# Patient Record
Sex: MALE | Race: WHITE
[De-identification: names, ages, dates, MRNs, and addresses within clinical notes are randomized per-mention and may not be internally consistent; named-entity substitution may affect disease eponyms.]

---

## 2020-08-05 ENCOUNTER — HOSPITAL ENCOUNTER (OUTPATIENT)
Dept: HOSPITAL 53 - M SDC | Age: 70
Discharge: HOME | End: 2020-08-05
Attending: INTERNAL MEDICINE
Payer: MEDICARE

## 2020-08-05 DIAGNOSIS — Z79.82: ICD-10-CM

## 2020-08-05 DIAGNOSIS — K21.9: ICD-10-CM

## 2020-08-05 DIAGNOSIS — E11.9: ICD-10-CM

## 2020-08-05 DIAGNOSIS — I10: ICD-10-CM

## 2020-08-05 DIAGNOSIS — K31.89: ICD-10-CM

## 2020-08-05 DIAGNOSIS — Z80.0: ICD-10-CM

## 2020-08-05 DIAGNOSIS — K44.9: ICD-10-CM

## 2020-08-05 DIAGNOSIS — Z87.891: ICD-10-CM

## 2020-08-05 DIAGNOSIS — Z12.11: Primary | ICD-10-CM

## 2020-08-05 DIAGNOSIS — Z79.899: ICD-10-CM

## 2020-08-05 DIAGNOSIS — K30: ICD-10-CM

## 2020-08-05 DIAGNOSIS — K64.0: ICD-10-CM

## 2020-08-05 PROCEDURE — 43239 EGD BIOPSY SINGLE/MULTIPLE: CPT

## 2020-08-05 PROCEDURE — 88305 TISSUE EXAM BY PATHOLOGIST: CPT

## 2020-09-09 NOTE — ROOR
________________________________________________________________________________

Patient Name: Kolton Corea       Procedure Date: 8/5/2020 7:33 AM

MRN: [id number]                          Account Number: [id number]

YOB: 1950                Age: 70

Room: [facility]                            Gender: Male

Note Status: Finalized                 

________________________________________________________________________________

 

Procedure:           Total Colonoscopy to Cecum

Indications:         Screening patient at increased risk: Family history of 

                     colorectal cancer in multiple 1st-degree relatives, Last 

                     colonoscopy: 2012

Providers:           Pola Bermudez MD

Referring MD:        ARISTIDES DA SILVA MD

Requesting Provider: 

Medicines:           Monitored Anesthesia Care

Complications:       No immediate complications.

________________________________________________________________________________

Procedure:           Pre-Anesthesia Assessment:

                     - The heart rate, respiratory rate, oxygen saturations, 

                     blood pressure, adequacy of pulmonary ventilation, and 

                     response to care were monitored throughout the procedure.

                     The Colonoscope was introduced through the anus and 

                     advanced to the cecum, identified by appendiceal orifice 

                     and ileocecal valve. The colonoscopy was performed 

                     without difficulty. The patient tolerated the procedure 

                     well. The quality of the bowel preparation was excellent.

                                                                                

Findings:

     The perianal and digital rectal examinations were normal.

     Non-bleeding internal hemorrhoids were found during retroflexion. The 

     hemorrhoids were small and Grade I (internal hemorrhoids that do not 

     prolapse).

     The exam was otherwise without abnormality on direct and retroflexion 

     views.

     The exam was otherwise without abnormality on direct and retroflexion 

     views.

                                                                                

Impression:          - Non-bleeding internal hemorrhoids.

                     - The examination was otherwise normal on direct and 

                     retroflexion views.

                     - The examination was otherwise normal on direct and 

                     retroflexion views.

                     - No specimens collected.

                     - The exam was otherwise normal to the cecum.

Recommendation:      - Patient has a contact number available for emergencies. 

                     The signs and symptoms of potential delayed complications 

                     were discussed with the patient. Return to normal 

                     activities tomorrow. Written discharge instructions were 

                     provided to the patient.

                     - Discharge patient to home.

                     - Continue present medications.

                     - Repeat colonoscopy in 5 years for screening purposes.

                     - Return to referring physician.

                     - The findings and recommendations were discussed with 

                     the patient.

                                                                                

 

Pola Bermudez MD

____________________

Pola Bermudez MD

8/5/2020 8:14:52 AM

Number of Addenda: 0

 

Note Initiated On: 8/5/2020 7:33 AM

Estimated Blood Loss:

     Estimated blood loss: none.

## 2020-09-09 NOTE — ROOR
________________________________________________________________________________

Patient Name: Kolton Corea       Procedure Date: 8/5/2020 7:32 AM

MRN: [id number]                          Account Number: [id number]

YOB: 1950                Age: 70

Room: [facility]                            Gender: Male

Note Status: Finalized                 

________________________________________________________________________________

 

Procedure:           Upper Endoscopy + Biopsies

Indications:         Epigastric abdominal pain, Functional Dyspepsia

Providers:           Pola Bermudez MD

Referring MD:        ARISTIDES DA SILVA MD

Requesting Provider: 

Medicines:           Monitored Anesthesia Care

Complications:       No immediate complications.

________________________________________________________________________________

Procedure:           Pre-Anesthesia Assessment:

                     - The heart rate, respiratory rate, oxygen saturations, 

                     blood pressure, adequacy of pulmonary ventilation, and 

                     response to care were monitored throughout the procedure.

                     The Endoscope was introduced through the mouth, and 

                     advanced to the second part of duodenum. The upper GI 

                     endoscopy was accomplished without difficulty. The 

                     patient tolerated the procedure well.

                                                                                

Findings:

     The Z-line was regular and was found 40 cm from the incisors. Multiple 

     biopsies were obtained with cold forceps for evaluation to rule out 

     Manriquez's Esophagus randomly at the gastroesophageal junction.

     A small hiatal hernia was present.

     Multiple localized, diminutive non-bleeding erosions were found in the 

     gastric antrum. There were no stigmata of recent bleeding. Biopsies were 

     taken with a cold forceps for Helicobacter pylori testing.

     The exam of the duodenum was otherwise normal.

                                                                                

Impression:          - Z-line regular, 40 cm from the incisors.

                     - Small hiatal hernia.

                     - Non-bleeding erosive gastropathy. Biopsied.

                     - Multiple biopsies were obtained at the gastroesophageal 

                     junction.

                     - The examination was otherwise normal.

Recommendation:      - Patient has a contact number available for emergencies. 

                     The signs and symptoms of potential delayed complications 

                     were discussed with the patient. Return to normal 

                     activities tomorrow. Written discharge instructions were 

                     provided to the patient.

                     - High fiber diet.

                     - Discharge patient to home.

                     - Follow an antireflux regimen.

                     - Continue present medications.

                     - Await pathology results.

                     - Telephone GI clinic for pathology results in 1 week.

                     - Repeat upper endoscopy for surveillance based on 

                     pathology results.

                     - Return to referring physician.

                     - The findings and recommendations were discussed with 

                     the patient.

                                                                                

 

Pola Bermudez MD

____________________

Pola Bermudez MD

8/5/2020 7:54:51 AM

Electronically signed by Pola Bermudez MD

Number of Addenda: 0

 

Note Initiated On: 8/5/2020 7:32 AM

Estimated Blood Loss:

     Estimated blood loss: none.

## 2020-11-16 ENCOUNTER — HOSPITAL ENCOUNTER (OUTPATIENT)
Dept: HOSPITAL 53 - M PAIN | Age: 70
End: 2020-11-16
Attending: NURSE PRACTITIONER
Payer: MEDICARE

## 2020-11-16 DIAGNOSIS — E11.9: ICD-10-CM

## 2020-11-16 DIAGNOSIS — Z79.899: ICD-10-CM

## 2020-11-16 DIAGNOSIS — Z79.82: ICD-10-CM

## 2020-11-16 DIAGNOSIS — I10: ICD-10-CM

## 2020-11-16 DIAGNOSIS — Z87.891: ICD-10-CM

## 2020-11-16 DIAGNOSIS — M54.2: Primary | ICD-10-CM

## 2020-11-18 NOTE — ECWPNPC
PATIENT NAME: QUE PERES

: 1950

GENDER: MALE

MRN: [id number]

VISIT DATE: 2020

DISCHARGE DATE: 20 1351

VISIT LOCKED DATE TIME: 

PHYSICIAN: VERNA CISSE 

RESOURCE: VERNA CISSE 

 

           

           

REASON FOR APPOINTMENT

           

          1. CERVICAL PAIN

           

HISTORY OF PRESENT ILLNESS

           

      DEPRESSION SCREENING:

      PHQ-9

           

           

          LITTLE INTEREST OR PLEASURE IN DOING THINGSNOT AT ALL

          FEELING DOWN, DEPRESSED, OR HOPELESSSEVERAL DAYS

          TROUBLE FALLING OR STAYING ASLEEP, OR SLEEPING TOO MUCHNOT AT ALL

          FEELING TIRED OR HAVING LITTLE ENERGYNOT AT ALL

          POOR APPETITE OR OVEREATING MORE THAN HALF THE DAYS

          FEELING BAD ABOUT YOURSELF-OR THAT YOU ARE A FAILURE OR HAVE LET

          YOURSELF OR YOUR FAMILY DOWN NOT AT ALL

          TROUBLE CONCENTRATING ON THINGS, SUCH AS READING THE NEWSPAPER OR

          WATCHING TELEVISION NOT AT ALL

          MOVING OR SPEAKING SO SLOWLY THAT OTHER PEOPLE COULD HAVE

          NOTICED. OR THE OPPOSITE- BEING SO FIDGETY OR RESTLESS THAT YOU

          HAVE BEEN MOVING AROUND A LOT MORE THAN USUALNOT AT ALL

          THOUGHTS THAT YOU WOULD BE BETTER OFF DEAD, OR OF HURTING

          YOURSELF IN SOME WAY?NOT AT ALL

          TOTAL SCORE:3

          INTERPRETATIONMINIMAL DEPRESSION

           

      PHQ-2 (2015 EDITION)

           

           

          LITTLE INTEREST OR PLEASURE IN DOING THINGS?SEVERAL DAYS

          FEELING DOWN, DEPRESSED, OR HOPELESS?SEVERAL DAYS

          TOTAL SCORE2

           

           70-YEAR-OLD MALE IN FOR INITIAL PAIN CONSULT. PATIENT HAS

          COMPLAINTS OF NECK PAIN THAT HAS BEEN PRESENT FOR THE PAST

          SEVERAL YEARS. HE DENIES HISTORY OF INJECTIONS TO HELP WITH HIS

          SYMPTOMS. PATIENT DOES ADMIT TO TAKING EXCEDRIN MIGRAINE WHICH

          DOES ALLEVIATE HIS SYMPTOMS CURRENTLY. HE RATES HIS PAIN

          CURRENTLY AT A 0 OUT OF 10 BUT DOES STATE WHEN HE HAS

          EXACERBATIONS OF HIS PAIN THAT RADIATES DOWN THE LEFT SIDE OF HIS

          NECK.

           

      GENERAL:

           - - -.

           

      FALL RISK SCREENING:

      SCREENING

           

           

          :TWO OR MORE FALLS WITHOUT INJURY IN THE PAST YEAR

           

      PAIN SCREENING:

      PATIENT HAS A COMPLAINT OF ACUTE OR CHRONIC PAIN

           

           

          :YES

          INTENSITY OF PAIN (SCALE OF 1 TO 10):0

           

      NURSING NOTE:

           - - -.

           

      PAIN CENTER INTAKE QUESTIONS:

      DO YOU HAVE A HISTORY OF MRSA?

           

           

          :NO

           

      DO YOU TAKE A BLOOD THINNERS?

           

           

          :NO

           

      DO YOU HAVE ANY BLEEDING DISORDERS?

           

           

          :NO

           

      ANY NEW NUMBNESS OR WEAKNESS IN YOUR LEGS OR ARMS?

           

           

          :NO

           

      ANY PACEMAKER,DEFIBRILLATOR, OR DORSAL COLUMN

          STIMULATOR?

           

           

          :NO

           

      DO YOU HAVE ANY RASHES OR OPEN SORES?

           

           

          :NO

           

      ARE YOU ALLERGIC TO IV DYE?

           

           

          :NO

           

      ARE YOU DIABETIC?

           

           

          :YES

           

      ANY NEW PROBLEMS WITH YOUR MEDICATIONS?

           

           

          :NO

           

      HAVE YOU RECEIVED A VACCINE IN THE PAST 30 DAYS?

           

           

          :YES

          IF SO WHAT VACCINE AND WHEN? FLU VACCINE 10/15/20

           

      DO YOU PLAN TO RECEIVE A VACCINE IN THE NEXT 21

          DAYS?

           

           

          :NO

           

      DO YOU NEED ANY PRESCRIPTION?

           

           

          :NO

           

      DO YOU TAKE ANY IMMUNOSUPPRESSIVE MEDICATIONS?

           

           

          :NO

           

CURRENT MEDICATIONS

           

          TAKING SIMVASTATIN 10 MG TABLET 1 TABLET IN THE EVENING ORALLY

          ONCE A DAY

          TAKING VITAMIN D 80279 UNIT CAPSULE 1 CAPSULE ORALLY EVERY OTHER

          WEEK

          TAKING CARVEDILOL 3.125 MG TABLET 1 TABLET WITH FOOD ORALLY TWICE

          A DAY

          TAKING FINASTERIDE 5 MG TABLET 1 TABLET ORALLY ONCE A DAY

          TAKING SILDENAFIL CITRATE 100 MG TABLET 1 TABLET AS NEEDED ORALLY

          ONCE A DAY

          TAKING OMEPRAZOLE 40 MG CAPSULE DELAYED RELEASE 1 CAPSULE 30

          MINUTES BEFORE MORNING MEAL ORALLY EVERY OTHER DAY

          TAKING DULOXETINE HCL 30 MG CAPSULE DELAYED RELEASE PARTICLES 1

          CAPSULE ORALLY ONCE A DAY

          TAKING ASPIRIN 81 81 MG TABLET DELAYED RELEASE 1 TABLET ORALLY

          ONCE A DAY

          TAKING MULTIVITAL - TABLET AS DIRECTED ORALLY

          TAKING AREDS OTC

          TAKING CALCIUM + D 600-200 MG-UNIT TABLET 1 TABLET ORALLY TWICE A

          DAY

          TAKING ASPIRIN-ACETAMINOPHEN-CAFFEINE 240-125-32 MG TABLET AS

          DIRECTED ORALLY

          MEDICATION LIST REVIEWED AND RECONCILED WITH THE PATIENT

           

PAST MEDICAL HISTORY

           

          DM

          HIGH CHOLESTEROL

          HTN

          CHRONIC NECK PAIN

           

ALLERGIES

           

          N.K.D.A.

           

SURGICAL HISTORY

           

          HEART CATHETERIZATION 

          CHOLECYSTECTOMY 

          DEVIATED SEPTUM REPAIR 

          PARATHYROID REMOVED 

           

FAMILY HISTORY

           

          FATHER: 

          MOTHER: 

          4 BROTHER(S) , 2 SISTER(S) . 3DAUGHTER(S) .

           

SOCIAL HISTORY

           

          GENERAL:

           

          TOBACCO USE

          ARE YOU A:FORMER SMOKER

          HOW LONG HAS IT BEEN SINCE YOU LAST SMOKED?> 10 YEARS

           

           

          LATEX QUESTIONNAIRE

          LATEX ALLERGY : HAVE YOU EVER DEVELOPED ANY TYPE OF REACTION

          AFTER HANDLING LATEX PRODUCTS SUCH AS RUBBER GLOVES, CONDOMS,

          DIAPHRAGMS, BALLOONS, SOCKS, OR UNDERWEAR?NO

          LATEX ALLERGY : HAVE YOU EVER DEVELOPED ANY TYPE OF REACTION

          DURING OR AFTER DENTAL APPOINTMENT, VAGINAL/RECTAL EXAMINATION,

          SURGICAL PROCEDURE, OR ANY OTHER EXPOSURE?NO

          LATEX RISK : HAVE YOU EVER HAD ANY DIFFICULTY BREATHING OR HIVES

          AFTER EATING OR HANDLING ANY FRUITS, OR VEGETABLES; SUCH AS KIWI,

          BANANAS, STONE FRUITS, OR CHESTNUTSNO

          LATEX RISK : DO YOU HAVE A PREVIOUS PERSONAL HISTORY OF MORE THAN

          NINE SURGERIES, SPINA BIFIDA, OR REPEATED CATHERIZATIONS? NO

          LATEX RISK : ARE YOU FREQUENTLY EXPOSED TO LATEX PRODUCTS IN YOUR

          OCCUPATION?NO

          DATE ASKED : 2020

           

           

          ALCOHOL SCREENING

          DID YOU HAVE A DRINK CONTAINING ALCOHOL IN THE PAST YEAR?NO

          POINTS0

          INTERPRETATIONNEGATIVE

           

           

          RECREATIONAL DRUG USE

          DRUG USE?NO

           

           

          LANGUAGE

          LANGUAGES SPOKEN:ENGLISH

           

           

          DOMESTIC VIOLENCE

          DO YOU FEEL SAFE IN YOUR ENVIRONMENT?YES

           

           

          PAIN CLINIC PFS, CLERGY, PUBLIC HEALTH REFERRALS

          HAS THE PATIENT BEEN EDUCATED REGARDING HIS/HER PLAN OF CARE?YES

          HAS THE PATIENT BEEN EDUCATED REGARDING PAIN, THE RISK FOR PAIN,

          THE IMPORTANCE OF EFFECTIVE PAIN MANAGEMENT, AND THE PAIN

          ASSESSMENT PROCESS?YES

           

           

          ADVANCE DIRECTIVE

          ADVANCE DIRECTIVE DISCUSSED WITH PATIENT:YES BELIA (WIFE)

           

HOSPITALIZATION/MAJOR DIAGNOSTIC PROCEDURE

           

          SURGERY

          SLEEP STUDY FOR SLEEP APNEA

           

REVIEW OF SYSTEMS

           

      CONSTITUTIONAL:

           

          ANY RECENT FEVER    NO . CHILLS    NO . WEIGHT CHANGE OF UNKNOWN

          REASONS    NO .

           

      MUSCULOSKELETAL:

           

          ANY UNUSUAL JOINT PAIN OR SWELLING NOT MENTIONED    NO . SYSTEMIC

          LUPUS    NO . ANY NEUROMUSCULAR DISORDER NOT MENTIONED    NO .

          LYME DISEASE    NO .

           

      GASTROENTEROLOGY:

           

          ANY NEW CHANGE IN BOWEL CONTROL?    NO . HISTORY OF LIVER

          DISORDER NOT MENTIONED    NO . HISTORY OF UNUSUAL ABDOMINAL PAIN

          OR CRAMPING NOT MENTIONED    NO . NO CONSTIPATION.

           

      GENITOURINARY:

           

          ANY NEW CHANGE IN BLADDER CONTROL?    NO . ANY RENAL/KIDNEY

          CONDITON NOT MENTIONED    NO .

           

      NEUROLOGY:

           

          HISTORY OF TBI NOT MENTIONED    NO . OTHER NEW NUMBNESS OR PAIN

          PATTERNS NOT MENTIONED    NO . NEW ONSET DIZZINESS OR

          NEUROLOGICAL CHANGES NOT MENTIONED    NO . HISTORY OF SEVERE

          HEADACHES NOT MENTIONED    NO . HISTORY OF STROKE OR NEUROLOGICAL

          DISORDER NOT MENTIONED    NO .

           

      CARDIOLOGY:

           

          HEART SURGERY    NO . CONGESTIVE HEART FAILURE/FLUID OVERLOAD NOT

          MENTIONED    NO . HISTORY OF CHEST PAIN,IRREGULAR HEART BEAT NOT

          MENTIONED    NO .

           

      RESPIRATORY:

           

          SHORTNESS OF BREATH ON EXERTION, WHEEZES, UNUSUAL COUGH NOT

          MENTIONED    NO .

           

      ENDOCRINOLOGY:

           

          ADRENAL GLAND OR THYROID DISORDERS NOT MENTIONED    NO . UNUSUAL

          URINATION, DIZZINESS OR LETHARGY NOT MENTIONED    NO .

           

VITAL SIGNS

           

          .4 LBS, HT 62 IN, BMI 39.94 INDEX, /64 MM HG, HR 67

          /MIN, RR 18 /MIN, TEMP 96.7 F, OXYGEN SAT % 99%, SAFE IN ENV?

          (Y/N) Y, NA INITIALS AW 1308, REVIEWED BY: EM.

           

EXAMINATION

           

      GENERAL EXAMINATION:

          GENERALNO ACUTE DISTRESS, WELL NOURISHED AND HYDRATED.

           

          PSYCHAPPROPRIATE MOOD AND AFFECT .

           

          NECK:DENIES POINT TENDERNESS ALONG CERVICAL SPINE, SURROUNDING

          SKIN SHOWS NO ERYTHEMA, ECCHYMOSIS, INCREASED WARMTH, AND/OR SKIN

          ERUPTIONS NOTED. SPURLING SIGN NEGATIVE .

           

          LUNGS:CLEAR TO AUSCULTATION BILATERALLY, NO WHEEZES, RHONCHI,

          RALES.

           

          HEART:NO MURMURS, REGULAR RATE AND RHYTHM.

           

ASSESSMENTS

           

          CERVICALGIA - M54.2 (PRIMARY)

           

TREATMENT

           

      CERVICALGIA

          [facility] MRI SPINE, CERVICAL WITHOUT [id number]

          NOTES: 70-YEAR-OLD MALE IN FOR INITIAL PAIN CONSULT. GIVEN

          PRESENTING SYMPTOMS RECOMMENDED MRI OF THE CERVICAL SPINE WITH

          POST IMAGING FOLLOW-UP. PATIENT HAS EXPRESSED UNDERSTANDING OF

          AND WAS IN AGREEMENT WITH TREATMENT PLAN. GIVEN TIME TO ASK

          QUESTIONS AND EXPRESS CONCERNS.

           

PROCEDURE CODES

           

           ESTABILISHED PATIENT [facility] FACILITY CHARGE

           

DISPOSITION & COMMUNICATION

           

FOLLOW UP

           

          POST IMAGING (REASON: MRI OF THE CERVICAL SPINE)

           

 

ELECTRONICALLY SIGNED BY ISAEL URENA ON

          2020 AT 08:41 AM EST

           

           

           

 

DISCLAIMER :

THIS IS A VISIT SUMMARY EXTRACTED FROM THE [employer] CHART.

IT IS NOT A COPY OF THE [employer] PROGRESS NOTE.

EMBER

## 2020-12-01 ENCOUNTER — HOSPITAL ENCOUNTER (OUTPATIENT)
Dept: HOSPITAL 53 - M PAIN | Age: 70
End: 2020-12-01
Attending: NURSE PRACTITIONER
Payer: MEDICARE

## 2020-12-01 DIAGNOSIS — M47.812: Primary | ICD-10-CM

## 2020-12-01 DIAGNOSIS — E78.00: ICD-10-CM

## 2020-12-01 DIAGNOSIS — E11.9: ICD-10-CM

## 2020-12-01 DIAGNOSIS — Z79.899: ICD-10-CM

## 2020-12-01 DIAGNOSIS — I10: ICD-10-CM

## 2020-12-01 DIAGNOSIS — Z79.82: ICD-10-CM

## 2020-12-01 DIAGNOSIS — Z87.891: ICD-10-CM

## 2020-12-02 NOTE — ECWPNPC
PATIENT NAME: QUE PERES

: 1950

GENDER: MALE

MRN: [id number]

VISIT DATE: 2020

DISCHARGE DATE: 20 1348

VISIT LOCKED DATE TIME: 

PHYSICIAN: VERNA CISSE 

RESOURCE: VERNA CISSE 

 

           

           

REASON FOR APPOINTMENT

           

          1. REVIEW MRI

           

HISTORY OF PRESENT ILLNESS

           

      PAIN CENTER INTAKE QUESTIONS:

           70-YEAR-OLD MALE IN FOR CHRONIC PAIN FOLLOW-UP AND TO REVIEW

          RECENT MRI. HE HAS NO COMPLAINTS OF PAIN AT THIS TIME.

           

      GENERAL:

           -.

           

      FALL RISK SCREENING:

      SCREENING

           

           

          :NO FALLS REPORTED IN THE LAST YEAR

           

      PAIN SCREENING:

      PATIENT HAS A COMPLAINT OF ACUTE OR CHRONIC PAIN

           

           

          :NO

           

      NURSING NOTE:

           -.

           

CURRENT MEDICATIONS

           

          TAKING SIMVASTATIN 10 MG TABLET 1 TABLET IN THE EVENING ORALLY

          ONCE A DAY

          TAKING VITAMIN D 50229 UNIT CAPSULE 1 CAPSULE ORALLY EVERY OTHER

          WEEK

          TAKING CARVEDILOL 3.125 MG TABLET 1 TABLET WITH FOOD ORALLY TWICE

          A DAY

          TAKING FINASTERIDE 5 MG TABLET 1 TABLET ORALLY ONCE A DAY

          TAKING SILDENAFIL CITRATE 100 MG TABLET 1 TABLET AS NEEDED ORALLY

          ONCE A DAY

          TAKING OMEPRAZOLE 40 MG CAPSULE DELAYED RELEASE 1 CAPSULE 30

          MINUTES BEFORE MORNING MEAL ORALLY EVERY OTHER DAY

          TAKING DULOXETINE HCL 30 MG CAPSULE DELAYED RELEASE PARTICLES 1

          CAPSULE ORALLY ONCE A DAY

          TAKING ASPIRIN 81 81 MG TABLET DELAYED RELEASE 1 TABLET ORALLY

          ONCE A DAY

          TAKING MULTIVITAL - TABLET AS DIRECTED ORALLY

          TAKING AREDS OTC

          TAKING CALCIUM + D 600-200 MG-UNIT TABLET 1 TABLET ORALLY TWICE A

          DAY

          TAKING ASPIRIN-ACETAMINOPHEN-CAFFEINE 240-125-32 MG TABLET AS

          DIRECTED ORALLY

          MEDICATION LIST REVIEWED AND RECONCILED WITH THE PATIENT

           

PAST MEDICAL HISTORY

           

          DM

          HIGH CHOLESTEROL

          HTN

          CHRONIC NECK PAIN

           

ALLERGIES

           

          N.K.D.A.

           

SURGICAL HISTORY

           

          HEART CATHETERIZATION 

          CHOLECYSTECTOMY 

          DEVIATED SEPTUM REPAIR 

          PARATHYROID REMOVED 

           

FAMILY HISTORY

           

          FATHER: 

          MOTHER: 

          4 BROTHER(S) , 2 SISTER(S) . 3DAUGHTER(S) .

           

SOCIAL HISTORY

           

          GENERAL:

           

          TOBACCO USE

          ARE YOU A:FORMER SMOKER

          HOW LONG HAS IT BEEN SINCE YOU LAST SMOKED?> 10 YEARS

           

           

          LATEX QUESTIONNAIRE

          LATEX ALLERGY : HAVE YOU EVER DEVELOPED ANY TYPE OF REACTION

          AFTER HANDLING LATEX PRODUCTS SUCH AS RUBBER GLOVES, CONDOMS,

          DIAPHRAGMS, BALLOONS, SOCKS, OR UNDERWEAR?NO

          LATEX ALLERGY : HAVE YOU EVER DEVELOPED ANY TYPE OF REACTION

          DURING OR AFTER DENTAL APPOINTMENT, VAGINAL/RECTAL EXAMINATION,

          SURGICAL PROCEDURE, OR ANY OTHER EXPOSURE?NO

          LATEX RISK : HAVE YOU EVER HAD ANY DIFFICULTY BREATHING OR HIVES

          AFTER EATING OR HANDLING ANY FRUITS, OR VEGETABLES; SUCH AS KIWI,

          BANANAS, STONE FRUITS, OR CHESTNUTSNO

          LATEX RISK : DO YOU HAVE A PREVIOUS PERSONAL HISTORY OF MORE THAN

          NINE SURGERIES, SPINA BIFIDA, OR REPEATED CATHERIZATIONS? NO

          LATEX RISK : ARE YOU FREQUENTLY EXPOSED TO LATEX PRODUCTS IN YOUR

          OCCUPATION?NO

          DATE ASKED : 2020

           

           

          ALCOHOL SCREENING

          DID YOU HAVE A DRINK CONTAINING ALCOHOL IN THE PAST YEAR?NO

          POINTS0

          INTERPRETATIONNEGATIVE

           

           

          RECREATIONAL DRUG USE

          DRUG USE?NO

           

           

          LANGUAGE

          LANGUAGES SPOKEN:ENGLISH

           

           

          DOMESTIC VIOLENCE

          DO YOU FEEL SAFE IN YOUR ENVIRONMENT?YES

           

           

          PAIN CLINIC PFS, CLERGY, PUBLIC HEALTH REFERRALS

          HAS THE PATIENT BEEN EDUCATED REGARDING HIS/HER PLAN OF CARE?YES

          HAS THE PATIENT BEEN EDUCATED REGARDING PAIN, THE RISK FOR PAIN,

          THE IMPORTANCE OF EFFECTIVE PAIN MANAGEMENT, AND THE PAIN

          ASSESSMENT PROCESS?YES

           

           

          ADVANCE DIRECTIVE

          ADVANCE DIRECTIVE DISCUSSED WITH PATIENT:YES BELIA (WIFE)

           

HOSPITALIZATION/MAJOR DIAGNOSTIC PROCEDURE

           

          SURGERY

          SLEEP STUDY FOR SLEEP APNEA

           

REVIEW OF SYSTEMS

           

      CONSTITUTIONAL:

           

          ANY RECENT FEVER    NO . CHILLS    NO . WEIGHT CHANGE OF UNKNOWN

          REASONS    NO .

           

      GASTROENTEROLOGY:

           

          NEW UNEXPLAINABLE CHANGES IN BOWEL CONTROL    NO . CONSTIPATION  

           NO .

           

      GENITOURINARY:

           

          ANY NEW CHANGE IN BLADDER CONTROL?    NO .

           

      NEUROLOGY:

           

          NEW ONSET DIZZINESS OR NEUROLOGICAL CHANGES NOT MENTIONED    NO .

          NEW NUMBNESS OR PAIN PATTERNS NOT MENTIONED AND PERTINENT TO

          TODAY'S VISIT    NO .

           

      CARDIOLOGY:

           

          NEW CHEST PRESSURE    NO . NEW CHEST PAIN    NO .

           

      RESPIRATORY:

           

          UNEXPLAINABLE COUGH    NO . NEW SHORTNESS OF BREATH    NO .

           

VITAL SIGNS

           

          .6 LBS, HT 62 IN, BMI 39.80 INDEX, /64 MM HG, HR 79

          /MIN, RR 18 /MIN, TEMP 97.1 F, OXYGEN SAT % 98%, NA INITIALS AW

          1303.

           

EXAMINATION

           

      GENERAL EXAMINATION:

          GENERALNO ACUTE DISTRESS, WELL NOURISHED AND HYDRATED.

           

          PSYCHAPPROPRIATE MOOD AND AFFECT .

           

          LUNGS:CLEAR TO AUSCULTATION BILATERALLY, NO WHEEZES, RHONCHI,

          RALES.

           

          HEART:NO MURMURS, REGULAR RATE AND RHYTHM.

           

ASSESSMENTS

           

          CERVICAL SPONDYLOSIS - M47.812 (PRIMARY)

           

TREATMENT

           

      CERVICAL SPONDYLOSIS

          NOTES: 70-YEAR-OLD MALE IN FOR CHRONIC PAIN FOLLOW-UP. MRI WAS

          REVIEWED WITH PATIENT TODAY. GIVEN PRESENTING SYMPTOMS

          RECOMMENDED REFERRAL TO PT AND THAT PATIENT FOLLOW-UP WHEN HE

          RETURNS FROM [place]. PATIENT HAS EXPRESSED UNDERSTANDING OF AND

          WAS IN AGREEMENT WITH TREATMENT PLAN. GIVEN TIME TO ASK QUESTIONS

          AND EXPRESS CONCERNS.

       REFERRAL TO:PHYSICAL THERAPY [facility]

          THERAPIST

           REASON:PT STRENGTHENING AND STRETCHING

           

PROCEDURE CODES

           

           ESTABILISHED PATIENT [facility] CHARGE

           

DISPOSITION & COMMUNICATION

           

FOLLOW UP

           

          WHEN PATIENT RETURNS FROM [place] (REASON: NECK PAIN )

           

 

ELECTRONICALLY SIGNED BY ISAEL URENA ON

          2020 AT 09:05 AM EST

           

           

           

 

DISCLAIMER :

THIS IS A VISIT SUMMARY EXTRACTED FROM THE [employer] CHART.

IT IS NOT A COPY OF THE [employer] PROGRESS NOTE.

EMBER

## 2021-05-26 ENCOUNTER — HOSPITAL ENCOUNTER (OUTPATIENT)
Dept: HOSPITAL 53 - M PAIN | Age: 71
End: 2021-05-26
Attending: NURSE PRACTITIONER
Payer: MEDICARE

## 2021-05-26 DIAGNOSIS — M54.2: Primary | ICD-10-CM

## 2021-05-26 DIAGNOSIS — Z79.82: ICD-10-CM

## 2021-05-26 DIAGNOSIS — E11.9: ICD-10-CM

## 2021-05-26 DIAGNOSIS — I10: ICD-10-CM

## 2021-05-26 DIAGNOSIS — Z79.899: ICD-10-CM

## 2021-05-26 DIAGNOSIS — E78.00: ICD-10-CM

## 2021-05-27 NOTE — ECWPNPC
PATIENT NAME: QUE PERES

: 1950

GENDER: MALE

MRN: [id number]

VISIT DATE: 2021

DISCHARGE DATE: 21 1029

VISIT LOCKED DATE TIME: 

PHYSICIAN: VERNA CISSE 

RESOURCE: VERNA CISSE 

 

           

           

REASON FOR APPOINTMENT

           

          1. NECK

           

HISTORY OF PRESENT ILLNESS

           

      GENERAL:

      HPI

          71-YEAR-OLD MALE IN FOR CHRONIC PAIN FOLLOW-UP. HE RATES HIS PAIN

          CURRENTLY AT A 1 OUT OF 10 AND DESCRIBES IT AS CONTINUOUS, SORE,

          AND THROBBING. AT LAST CLINIC VISIT PATIENT WAS PRESCRIBED

          PHYSICAL THERAPY HOWEVER HE WAS UNABLE TO GO AT THAT TIME..

           

           -.

           

      FALL RISK SCREENING:

      SCREENING

          PT STATES HE HAS HAD A FEW FALLS THIS PAST YEAR WITH NO INJURIES.

           

      PAIN SCREENING:

      PATIENT HAS A COMPLAINT OF ACUTE OR CHRONIC PAIN

           

           

          :YES

          LOCATION OF PAIN:NECK, FACE

          INTENSITY OF PAIN (SCALE OF 1 TO 10):1

          WHAT DOES YOUR PAIN FEEL LIKE:CONTINOUS, SORE, THROBBING

           

      NURSING NOTE:

           -.

           

      PAIN CENTER INTAKE QUESTIONS:

      DO YOU HAVE A HISTORY OF MRSA?

           

           

          :NO

           

      DO YOU TAKE A BLOOD THINNERS?

           

           

          :NO

           

      DO YOU HAVE ANY BLEEDING DISORDERS?

           

           

          :NO

           

      ANY NEW NUMBNESS OR WEAKNESS IN YOUR LEGS OR ARMS?

           

           

          :NO

           

      ANY PACEMAKER,DEFIBRILLATOR, OR DORSAL COLUMN

          STIMULATOR?

           

           

          :NO

           

      DO YOU HAVE ANY RASHES OR OPEN SORES?

           

           

          :NO

           

      ARE YOU ALLERGIC TO IV DYE?

           

           

          :NO

           

      ARE YOU DIABETIC?

           

           

          :NO

           

      ANY NEW PROBLEMS WITH YOUR MEDICATIONS?

           

           

          :NO

           

      HAVE YOU RECEIVED A VACCINE IN THE PAST 30 DAYS?

           

           

          :NO

           

      DO YOU PLAN TO RECEIVE A VACCINE IN THE NEXT 21

          DAYS?

           

           

          :NO

           

      DO YOU NEED ANY PRESCRIPTION?

           

           

          :NO

           

      DO YOU TAKE ANY IMMUNOSUPPRESSIVE MEDICATIONS?

           

           

          :NO

           

      DO YOU HAVE ANY KIDNEY OR LIVER DISEASE?

           

           

          :NO

           

      IS THERE A CHANCE YOU COULD BE PREGNANT?

           

           

          :NO

           

      ARE YOU BREAST FEEDING?

           

           

          :NO

           

CURRENT MEDICATIONS

           

          TAKING SIMVASTATIN 10 MG TABLET 1 TABLET IN THE EVENING ORALLY

          ONCE A DAY

          TAKING VITAMIN D 72528 UNIT CAPSULE 1 CAPSULE ORALLY EVERY OTHER

          WEEK

          TAKING CARVEDILOL 3.125 MG TABLET 1 TABLET WITH FOOD ORALLY TWICE

          A DAY

          TAKING FINASTERIDE 5 MG TABLET 1 TABLET ORALLY ONCE A DAY

          TAKING SILDENAFIL CITRATE 100 MG TABLET 1 TABLET AS NEEDED ORALLY

          ONCE A DAY

          TAKING OMEPRAZOLE 20 MG CAPSULE DELAYED RELEASE 1 CAPSULE 30

          MINUTES BEFORE MORNING MEAL ORALLY EVERY OTHER DAY

          TAKING DULOXETINE HCL 30 MG CAPSULE DELAYED RELEASE PARTICLES 1

          CAPSULE ORALLY ONCE A DAY

          TAKING ASPIRIN 81 81 MG TABLET DELAYED RELEASE 1 TABLET ORALLY

          ONCE A DAY

          TAKING MULTIVITAL - TABLET AS DIRECTED ORALLY

          TAKING AREDS OTC

          TAKING CALCIUM + D 600-200 MG-UNIT TABLET 1 TABLET ORALLY TWICE A

          DAY

          TAKING ASPIRIN-ACETAMINOPHEN-CAFFEINE 240-125-32 MG TABLET AS

          DIRECTED ORALLY

          MEDICATION LIST REVIEWED AND RECONCILED WITH THE PATIENT

           

PAST MEDICAL HISTORY

           

          DM

          HIGH CHOLESTEROL

          HTN

          CHRONIC NECK PAIN

           

ALLERGIES

           

          N.K.D.A.

           

SURGICAL HISTORY

           

          HEART CATHETERIZATION 2016

          CHOLECYSTECTOMY 

          DEVIATED SEPTUM REPAIR 

          PARATHYROID REMOVED 

           

FAMILY HISTORY

           

          FATHER: 

          MOTHER: 

          4 BROTHER(S) , 2 SISTER(S) . 3DAUGHTER(S) .

           

HOSPITALIZATION/MAJOR DIAGNOSTIC PROCEDURE

           

          SURGERY

          SLEEP STUDY FOR SLEEP APNEA

           

REVIEW OF SYSTEMS

           

      CONSTITUTIONAL:

           

          ANY RECENT FEVER    NO . CHILLS    NO . WEIGHT CHANGE OF UNKNOWN

          REASONS    NO .

           

      GASTROENTEROLOGY:

           

          NEW UNEXPLAINABLE CHANGES IN BOWEL CONTROL    NO . CONSTIPATION  

           NO .

           

      GENITOURINARY:

           

          ANY NEW CHANGE IN BLADDER CONTROL?    NO .

           

      NEUROLOGY:

           

          NEW ONSET DIZZINESS OR NEUROLOGICAL CHANGES NOT MENTIONED    NO .

          NEW NUMBNESS OR PAIN PATTERNS NOT MENTIONED AND PERTINENT TO

          TODAY'S VISIT    NO .

           

      CARDIOLOGY:

           

          NEW CHEST PRESSURE    NO . PATIENT DENIES    NO .

           

      RESPIRATORY:

           

          UNEXPLAINABLE COUGH    NO . NEW SHORTNESS OF BREATH    NO .

           

VITAL SIGNS

           

          .4 LBS, HT 62 IN, BMI 37.20 INDEX, /63 MM HG, HR 66

          /MIN, RR 18 /MIN, TEMP 98.3 F, OXYGEN SAT % 97%, NA INITIALS SC

          10:04.

           

EXAMINATION

           

      GENERAL EXAMINATION:

          GENERALNO ACUTE DISTRESS, WELL NOURISHED AND HYDRATED.

           

          PSYCHAPPROPRIATE MOOD AND AFFECT .

           

          LUNGS:CLEAR TO AUSCULTATION BILATERALLY, NO WHEEZES, RHONCHI,

          RALES.

           

          HEART:NO MURMURS, REGULAR RATE AND RHYTHM.

           

ASSESSMENTS

           

          CERVICALGIA - M54.2 (PRIMARY)

           

TREATMENT

           

      CERVICALGIA

          NOTES: 71-YEAR-OLD MALE IN FOR CHRONIC PAIN FOLLOW-UP. GIVEN

          PRESENTING SYMPTOMS RECOMMENDED REFERRAL TO PHYSICAL THERAPY WITH

          FOLLOW-UP IN 2 MONTHS. PATIENT HAS EXPRESSED UNDERSTANDING OF AND

          WAS IN AGREEMENT WITH TREATMENT PLAN. GIVEN TIME TO ASK QUESTIONS

          AND EXPRESS CONCERNS.

       REFERRAL TO:PHYSICAL THERAPY [facility]

          THERAPIST

           REASON:STRENGTHENING STRETCHING

           

DISPOSITION & COMMUNICATION

           

FOLLOW UP

           

          2 MONTHS (REASON: NECK PAIN )

           

 

ELECTRONICALLY SIGNED BY ISAEL URENA ON

          2021 AT 08:37 AM EDT

           

           

           

 

DISCLAIMER :

THIS IS A VISIT SUMMARY EXTRACTED FROM THE [employer] CHART.

IT IS NOT A COPY OF THE [employer] PROGRESS NOTE.

MTDD

## 2021-08-02 ENCOUNTER — HOSPITAL ENCOUNTER (OUTPATIENT)
Dept: HOSPITAL 53 - M PAIN | Age: 71
End: 2021-08-02
Attending: NURSE PRACTITIONER
Payer: MEDICARE

## 2021-08-02 DIAGNOSIS — Z79.82: ICD-10-CM

## 2021-08-02 DIAGNOSIS — Z79.899: ICD-10-CM

## 2021-08-02 DIAGNOSIS — G89.29: ICD-10-CM

## 2021-08-02 DIAGNOSIS — M47.812: Primary | ICD-10-CM

## 2021-08-02 DIAGNOSIS — Z87.891: ICD-10-CM

## 2021-08-02 DIAGNOSIS — E11.9: ICD-10-CM

## 2021-08-04 NOTE — ECWPNPC
PATIENT NAME: QUE PERES

: 1950

GENDER: MALE

MRN: [id number]

VISIT DATE: 2021

DISCHARGE DATE: 21 1022

VISIT LOCKED DATE TIME: 

PHYSICIAN: VERNA CISSE 

RESOURCE: VERNA CISSE 

 

           

           

REASON FOR APPOINTMENT

           

          1. NECK

           

HISTORY OF PRESENT ILLNESS

           

      DEPRESSION SCREENING:

      PHQ-2 (2015 EDITION)

           

           

          LITTLE INTEREST OR PLEASURE IN DOING THINGS?NOT AT ALL

          FEELING DOWN, DEPRESSED, OR HOPELESS?NOT AT ALL

          TOTAL SCORE0

           

      GENERAL:

      HPI

          71-YEAR-OLD MALE IN FOR CHRONIC PAIN FOLLOW-UP. HE RATES HIS PAIN

          CURRENTLY AT A 0 OUT OF 10. HE WAS SEEN BY PHYSICAL THERAPY AND

          FOUND THIS TO BE BENEFICIAL IN REDUCING HIS PAIN SYMPTOMS. .

           

           -.

           

      FALL RISK SCREENING:

      SCREENING

          TWO FALLS REPORTED IN THE LAST YEAR WITHOUT INJURY..

           

      PAIN SCREENING:

      PATIENT HAS A COMPLAINT OF ACUTE OR CHRONIC PAIN

           

           

          :NO PATIENT DENIES PAIN AT THIS TIME.

           

      NURSING NOTE:

           -.

           

      PAIN CENTER INTAKE QUESTIONS:

      DO YOU HAVE A HISTORY OF MRSA?

           

           

          :NO

           

      DO YOU TAKE A BLOOD THINNERS?

           

           

          :NO 81MG ASPIRIN DAILY

           

      DO YOU HAVE ANY BLEEDING DISORDERS?

           

           

          :NO

           

      ANY NEW NUMBNESS OR WEAKNESS IN YOUR LEGS OR ARMS?

           

           

          :NO

           

      ANY PACEMAKER,DEFIBRILLATOR, OR DORSAL COLUMN

          STIMULATOR?

           

           

          :NO

           

      DO YOU HAVE ANY RASHES OR OPEN SORES?

           

           

          :NO

           

      ARE YOU ALLERGIC TO IV DYE?

           

           

          :NO

           

      ARE YOU DIABETIC?

           

           

          :YES DIET AND EXERCISE CONTROLLED

           

      ANY NEW PROBLEMS WITH YOUR MEDICATIONS?

           

           

          :NO

           

      HAVE YOU RECEIVED A VACCINE IN THE PAST 30 DAYS?

           

           

          :NO

           

      DO YOU PLAN TO RECEIVE A VACCINE IN THE NEXT 21

          DAYS?

           

           

          :NO

           

      DO YOU NEED ANY PRESCRIPTION?

           

           

          :NO

           

      DO YOU TAKE ANY IMMUNOSUPPRESSIVE MEDICATIONS?

           

           

          :NO

           

      DO YOU HAVE ANY KIDNEY OR LIVER DISEASE?

           

           

          :NO

           

      IS THERE A CHANCE YOU COULD BE PREGNANT?

           

           

          :NO

           

      ARE YOU BREAST FEEDING?

           

           

          :NO

           

CURRENT MEDICATIONS

           

          TAKING SIMVASTATIN 10 MG TABLET 1 TABLET IN THE EVENING ORALLY

          ONCE A DAY

          TAKING VITAMIN D 50500 UNIT CAPSULE 1 CAPSULE ORALLY EVERY OTHER

          WEEK

          TAKING CARVEDILOL 3.125 MG TABLET 1 TABLET WITH FOOD ORALLY TWICE

          A DAY

          TAKING FINASTERIDE 5 MG TABLET 1 TABLET ORALLY ONCE A DAY

          TAKING SILDENAFIL CITRATE 100 MG TABLET 1 TABLET AS NEEDED ORALLY

          ONCE A DAY

          TAKING OMEPRAZOLE 20 MG CAPSULE DELAYED RELEASE 1 CAPSULE 30

          MINUTES BEFORE MORNING MEAL ORALLY EVERY OTHER DAY

          TAKING DULOXETINE HCL 30 MG CAPSULE DELAYED RELEASE PARTICLES 1

          CAPSULE ORALLY ONCE A DAY

          TAKING ASPIRIN 81 81 MG TABLET DELAYED RELEASE 1 TABLET ORALLY

          ONCE A DAY

          TAKING MULTIVITAL - TABLET AS DIRECTED ORALLY

          TAKING AREDS OTC

          TAKING CALCIUM + D 600-200 MG-UNIT TABLET 1 TABLET ORALLY TWICE A

          DAY

          TAKING ASPIRIN-ACETAMINOPHEN-CAFFEINE 240-125-32 MG TABLET AS

          DIRECTED ORALLY

          MEDICATION LIST REVIEWED AND RECONCILED WITH THE PATIENT

           

PAST MEDICAL HISTORY

           

          DM

          HIGH CHOLESTEROL

          HTN

          CHRONIC NECK PAIN

           

ALLERGIES

           

          N.K.D.A.

           

SOCIAL HISTORY

           

          GENERAL:

           

          TOBACCO USE

          ARE YOU A:FORMER SMOKER

          HOW LONG HAS IT BEEN SINCE YOU LAST SMOKED?> 10 YEARS

           

           

          LATEX QUESTIONNAIRE

          LATEX ALLERGY : HAVE YOU EVER DEVELOPED ANY TYPE OF REACTION

          AFTER HANDLING LATEX PRODUCTS SUCH AS RUBBER GLOVES, CONDOMS,

          DIAPHRAGMS, BALLOONS, SOCKS, OR UNDERWEAR?NO

          LATEX ALLERGY : HAVE YOU EVER DEVELOPED ANY TYPE OF REACTION

          DURING OR AFTER DENTAL APPOINTMENT, VAGINAL/RECTAL EXAMINATION,

          SURGICAL PROCEDURE, OR ANY OTHER EXPOSURE?NO

          LATEX RISK : HAVE YOU EVER HAD ANY DIFFICULTY BREATHING OR HIVES

          AFTER EATING OR HANDLING ANY FRUITS, OR VEGETABLES; SUCH AS KIWI,

          BANANAS, STONE FRUITS, OR CHESTNUTSNO

          LATEX RISK : DO YOU HAVE A PREVIOUS PERSONAL HISTORY OF MORE THAN

          NINE SURGERIES, SPINA BIFIDA, OR REPEATED CATHERIZATIONS? NO

          LATEX RISK : ARE YOU FREQUENTLY EXPOSED TO LATEX PRODUCTS IN YOUR

          OCCUPATION?NO

          DATE ASKED : 2021

           

           

          ALCOHOL USE: SOCIALLY.

           

           

          ALCOHOL SCREENING

          DID YOU HAVE A DRINK CONTAINING ALCOHOL IN THE PAST YEAR?NO

          POINTS0

          INTERPRETATIONNEGATIVE

           

           

          RECREATIONAL DRUG USE

          DRUG USE?NO

           

           

          LANGUAGE

          LANGUAGES SPOKEN:ENGLISH

           

           

          EDUCATION

          LEVEL OF EDUCATION:[employer]/PHD

           

           

          LEARNING BARRIERS / SPECIAL NEEDS

          BARRIERS TO LEARNING?NO

          HEARING IMPAIRED?YES

          VISION IMPAIRED?YES

          :CORRECTIVE LENSES

          COGNITIVELY IMPAIRED?NO

          READINESS TO LEARN?YES

          LEARNING PREFERENCES?NO

          LEARNING CAPABILITIES PRESENT?YES

          EMOTIONAL BARRIERS?NO ANXIETY AND DEPRESSION

          SPECIAL DEVICES?NO

           NEEDED?NO

           

           

          DOMESTIC VIOLENCE

          DO YOU FEEL SAFE IN YOUR ENVIRONMENT?YES

           

           

          -

          HAS THE PATIENT BEEN EDUCATED REGARDING HIS/HER PLAN OF CARE?YES

          HAS THE PATIENT BEEN EDUCATED REGARDING PAIN, THE RISK FOR PAIN,

          THE IMPORTANCE OF EFFECTIVE PAIN MANAGEMENT, AND THE PAIN

          ASSESSMENT PROCESS?YES

           

           

          ADVANCE DIRECTIVE

          ADVANCE DIRECTIVE DISCUSSED WITH PATIENT:YES BELIA (WIFE)

           

REVIEW OF SYSTEMS

           

      CONSTITUTIONAL:

           

          ANY RECENT FEVER    NO . CHILLS    NO . WEIGHT CHANGE OF UNKNOWN

          REASONS    NO .

           

      GASTROENTEROLOGY:

           

          NEW UNEXPLAINABLE CHANGES IN BOWEL CONTROL    NO . CONSTIPATION  

           NO .

           

      GENITOURINARY:

           

          ANY NEW CHANGE IN BLADDER CONTROL?    NO .

           

      NEUROLOGY:

           

          NEW ONSET DIZZINESS OR NEUROLOGICAL CHANGES NOT MENTIONED    NO .

          NEW NUMBNESS OR PAIN PATTERNS NOT MENTIONED AND PERTINENT TO

          TODAY'S VISIT    NO .

           

      CARDIOLOGY:

           

          NEW CHEST PRESSURE    NO . PATIENT DENIES    NO .

           

      RESPIRATORY:

           

          UNEXPLAINABLE COUGH    NO . NEW SHORTNESS OF BREATH    NO .

           

VITAL SIGNS

           

          .4 LBS, WT-KG 94.53 KG, HT 62 IN, BMI 38.11 INDEX, BP

          141/66 MM HG, HR 61 /MIN, RR 18 /MIN, TEMP 97.3 F, OXYGEN SAT %

          99%, SAFE IN ENV? (Y/N) YES, NA INITIALS SC 10:07, REVIEWED BY:

          MICHAEL DOUGLAS MA.

           

EXAMINATION

           

      GENERAL EXAMINATION:

          GENERALNO ACUTE DISTRESS, WELL NOURISHED AND HYDRATED.

           

          PSYCHAPPROPRIATE MOOD AND AFFECT .

           

          LUNGS:CLEAR TO AUSCULTATION BILATERALLY, NO WHEEZES, RHONCHI,

          RALES.

           

          HEART:NO MURMURS, REGULAR RATE AND RHYTHM.

           

ASSESSMENTS

           

          CERVICAL SPONDYLOSIS - M47.812

           

TREATMENT

           

      CERVICAL SPONDYLOSIS

          NOTES: 71-YEAR-OLD MALE IN FOR CHRONIC PAIN FOLLOW-UP. GIVEN

          PRESENTING SYMPTOMS RECOMMEND FOLLOW-UP ON AN AS-NEEDED BASIS.

          PATIENT HAS EXPRESSED UNDERSTANDING OF AND WAS IN AGREEMENT WITH

          TREATMENT PLAN. GIVEN TIME ASKED QUESTIONS AND EXPRESS CONCERNS.

           

PROCEDURE CODES

           

           ESTABILISHED PATIENT [facility] FACILITY CHARGE

           

DISPOSITION & COMMUNICATION

           

FOLLOW UP

           

          AS NEEDED (REASON: NECK PAIN)

           

 

ELECTRONICALLY SIGNED BY ISAEL URENA ON

          2021 AT 08:29 AM EDT

           

           

           

 

DISCLAIMER :

THIS IS A VISIT SUMMARY EXTRACTED FROM THE [employer] CHART.

IT IS NOT A COPY OF THE [employer] PROGRESS NOTE.

EMBER

## 2021-09-02 ENCOUNTER — HOSPITAL ENCOUNTER (OUTPATIENT)
Dept: HOSPITAL 53 - M RAD | Age: 71
End: 2021-09-02
Attending: OTOLARYNGOLOGY
Payer: MEDICARE

## 2021-09-02 DIAGNOSIS — H90.3: Primary | ICD-10-CM

## 2021-09-02 DIAGNOSIS — H93.13: ICD-10-CM

## 2021-09-02 PROCEDURE — 70553 MRI BRAIN STEM W/O & W/DYE: CPT

## 2021-09-03 NOTE — REPVR
PROCEDURE INFORMATION: 

Exam: MR Head Without and With Contrast; Internal Auditory Canals 

Exam date and time: 9/2/2021 3:35 PM 

Age: 71 years old 

Clinical indication: Other: Hearing loss 



TECHNIQUE: 

Imaging protocol: MR of the head without and with intravenous contrast. Exam 

focused on the internal auditory canals. 

Contrast material: PROHANCE; Contrast volume: 18 ml; Contrast route: 

INTRAVENOUS (IV);  



COMPARISON: 

No relevant prior studies available. 



FINDINGS: 

Brain: There is mild patchy increased T2 signal intensity within the bilateral 

cerebral periventricular white matter, consistent with chronic microvascular 

ischemic changes. There are few small focal areas of chronic ischemia in 

bilateral frontal, parietal and periatrial white matter. There is no abnormal 

diffusion weighted signal intensity to suggest an acute ischemic event. There 

is mild diffuse cerebral atrophy present, consistent with this patient's age. 

No abnormal contrast enhancement is seen. 

Ventricles: The ventricular system demonstrates mild diffuse compensatory 

enlargement. 

Sinuses: Mild mucosal thickening is seen in the paranasal sinuses. 

Mastoid air cells: The visualized mastoid air cells are clear. 

Internal auditory canals: The 7th and 8th cranial nerves are normal in 

thickness and signal intensity bilaterally. No discrete mass or abnormal 

enhancement is seen to suggest vestibular schwannoma or acoustic neuroma. 

Bones/joints: Unremarkable. 



IMPRESSION: 

1. No acute infarction, masses or hemorrhage is seen. No acute intracranial 

abnormality is identified. 

2. Diffuse age-related cerebral atrophy and mild chronic microvascular white 

matter ischemic changes, without evidence of an acute intracranial abnormality. 

3. The 7th and 8th cranial nerves are normal in thickness and signal intensity 

bilaterally. No discrete mass or abnormal enhancement is seen to suggest 

vestibular schwannoma or acoustic neuroma. 

4. No abnormal contrast enhancement is seen. 



Electronically signed by: Ross Wallace On 09/03/2021  13:50:14 PM

## 2024-09-16 ENCOUNTER — HOSPITAL ENCOUNTER (OUTPATIENT)
Dept: HOSPITAL 53 - M SDC | Age: 74
Discharge: HOME | End: 2024-09-16
Attending: OPHTHALMOLOGY
Payer: MEDICARE

## 2024-09-16 VITALS — DIASTOLIC BLOOD PRESSURE: 79 MMHG | OXYGEN SATURATION: 97 % | TEMPERATURE: 97.8 F | SYSTOLIC BLOOD PRESSURE: 141 MMHG

## 2024-09-16 VITALS — WEIGHT: 215 LBS | BODY MASS INDEX: 29.12 KG/M2 | HEIGHT: 72 IN

## 2024-09-16 DIAGNOSIS — Z87.891: ICD-10-CM

## 2024-09-16 DIAGNOSIS — Z79.899: ICD-10-CM

## 2024-09-16 DIAGNOSIS — H25.11: Primary | ICD-10-CM

## 2024-09-16 PROCEDURE — 66984 XCAPSL CTRC RMVL W/O ECP: CPT

## 2024-09-16 RX ADMIN — TETRACAINE HYDROCHLORIDE SCH DROP: 5 SOLUTION OPHTHALMIC at 08:40

## 2024-09-16 RX ADMIN — LIDOCAINE HYDROCHLORIDE ONE ML: 10 INJECTION, SOLUTION EPIDURAL; INFILTRATION; INTRACAUDAL; PERINEURAL at 09:51

## 2024-09-16 RX ADMIN — CEFUROXIME ONE MG: 750 INJECTION, POWDER, FOR SOLUTION INTRAMUSCULAR; INTRAVENOUS at 09:59

## 2024-09-16 RX ADMIN — PHENYLEPHRINE HYDROCHLORIDE SCH DROP: 25 SOLUTION/ DROPS OPHTHALMIC at 08:40

## 2024-09-16 RX ADMIN — ATROPINE SULFATE SCH DROP: 10 SOLUTION/ DROPS OPHTHALMIC at 08:39

## 2024-09-16 RX ADMIN — FLURBIPROFEN SODIUM SCH DROP: 0.3 SOLUTION/ DROPS OPHTHALMIC at 08:40

## 2024-10-28 ENCOUNTER — HOSPITAL ENCOUNTER (OUTPATIENT)
Dept: HOSPITAL 53 - M SDC | Age: 74
Discharge: HOME | End: 2024-10-28
Attending: OPHTHALMOLOGY
Payer: MEDICARE

## 2024-10-28 VITALS — BODY MASS INDEX: 28.71 KG/M2 | HEIGHT: 72 IN | WEIGHT: 212 LBS

## 2024-10-28 VITALS — OXYGEN SATURATION: 97 % | SYSTOLIC BLOOD PRESSURE: 142 MMHG | TEMPERATURE: 97.4 F | DIASTOLIC BLOOD PRESSURE: 83 MMHG

## 2024-10-28 DIAGNOSIS — H25.12: Primary | ICD-10-CM

## 2024-10-28 DIAGNOSIS — Z79.899: ICD-10-CM

## 2024-10-28 PROCEDURE — 66984 XCAPSL CTRC RMVL W/O ECP: CPT

## 2024-10-28 RX ADMIN — LIDOCAINE HYDROCHLORIDE ONE ML: 10 INJECTION, SOLUTION EPIDURAL; INFILTRATION; INTRACAUDAL; PERINEURAL at 14:35

## 2024-10-28 RX ADMIN — TETRACAINE HYDROCHLORIDE SCH DROP: 5 SOLUTION OPHTHALMIC at 13:02

## 2024-10-28 RX ADMIN — CEFUROXIME ONE MG: 750 INJECTION, POWDER, FOR SOLUTION INTRAMUSCULAR; INTRAVENOUS at 14:47

## 2024-10-28 RX ADMIN — FLURBIPROFEN SODIUM SCH DROP: 0.3 SOLUTION/ DROPS OPHTHALMIC at 13:02

## 2024-10-28 RX ADMIN — PHENYLEPHRINE HYDROCHLORIDE SCH DROP: 25 SOLUTION/ DROPS OPHTHALMIC at 13:02

## 2024-10-28 RX ADMIN — ATROPINE SULFATE SCH DROP: 10 SOLUTION/ DROPS OPHTHALMIC at 13:02
